# Patient Record
Sex: FEMALE | Race: WHITE | Employment: UNEMPLOYED | URBAN - METROPOLITAN AREA
[De-identification: names, ages, dates, MRNs, and addresses within clinical notes are randomized per-mention and may not be internally consistent; named-entity substitution may affect disease eponyms.]

---

## 2017-01-01 ENCOUNTER — HOSPITAL ENCOUNTER (EMERGENCY)
Age: 52
Discharge: LEFT BEFORE DISCHARGE | End: 2017-02-24
Attending: EMERGENCY MEDICINE
Payer: COMMERCIAL

## 2017-01-01 ENCOUNTER — APPOINTMENT (OUTPATIENT)
Dept: CT IMAGING | Age: 52
DRG: 871 | End: 2017-01-01
Attending: EMERGENCY MEDICINE
Payer: SELF-PAY

## 2017-01-01 ENCOUNTER — APPOINTMENT (OUTPATIENT)
Dept: GENERAL RADIOLOGY | Age: 52
DRG: 871 | End: 2017-01-01
Attending: EMERGENCY MEDICINE
Payer: SELF-PAY

## 2017-01-01 ENCOUNTER — TELEPHONE (OUTPATIENT)
Dept: HOSPICE | Facility: HOSPICE | Age: 52
End: 2017-01-01

## 2017-01-01 ENCOUNTER — HOSPITAL ENCOUNTER (INPATIENT)
Age: 52
LOS: 3 days | DRG: 871 | End: 2017-03-15
Attending: EMERGENCY MEDICINE | Admitting: FAMILY MEDICINE
Payer: SELF-PAY

## 2017-01-01 VITALS
HEIGHT: 65 IN | SYSTOLIC BLOOD PRESSURE: 144 MMHG | RESPIRATION RATE: 18 BRPM | OXYGEN SATURATION: 90 % | HEART RATE: 79 BPM | BODY MASS INDEX: 21.61 KG/M2 | TEMPERATURE: 97.7 F | WEIGHT: 129.7 LBS | DIASTOLIC BLOOD PRESSURE: 73 MMHG

## 2017-01-01 VITALS
SYSTOLIC BLOOD PRESSURE: 111 MMHG | DIASTOLIC BLOOD PRESSURE: 71 MMHG | BODY MASS INDEX: 23.32 KG/M2 | RESPIRATION RATE: 22 BRPM | HEIGHT: 65 IN | WEIGHT: 140 LBS | HEART RATE: 99 BPM | TEMPERATURE: 98.6 F | OXYGEN SATURATION: 96 %

## 2017-01-01 DIAGNOSIS — A41.9 SEPSIS, DUE TO UNSPECIFIED ORGANISM: ICD-10-CM

## 2017-01-01 DIAGNOSIS — K55.9 ISCHEMIC BOWEL DISEASE (HCC): ICD-10-CM

## 2017-01-01 DIAGNOSIS — K70.31 ALCOHOLIC CIRRHOSIS OF LIVER WITH ASCITES (HCC): ICD-10-CM

## 2017-01-01 DIAGNOSIS — K66.8 PNEUMOPERITONEUM: Primary | ICD-10-CM

## 2017-01-01 DIAGNOSIS — F41.9 ANXIETY: ICD-10-CM

## 2017-01-01 DIAGNOSIS — K72.10 CHRONIC LIVER FAILURE WITHOUT HEPATIC COMA (HCC): ICD-10-CM

## 2017-01-01 DIAGNOSIS — G40.909 SEIZURE DISORDER (HCC): ICD-10-CM

## 2017-01-01 DIAGNOSIS — R06.02 SHORTNESS OF BREATH AT REST: ICD-10-CM

## 2017-01-01 LAB
ALBUMIN SERPL BCP-MCNC: 3.4 G/DL (ref 3.4–5)
ALBUMIN/GLOB SERPL: 0.6 {RATIO} (ref 0.8–1.7)
ALP SERPL-CCNC: 104 U/L (ref 45–117)
ALT SERPL-CCNC: 52 U/L (ref 13–56)
AMMONIA PLAS-SCNC: 55 UMOL/L (ref 11–32)
AMORPH CRY URNS QL MICRO: ABNORMAL
ANION GAP BLD CALC-SCNC: 12 MMOL/L (ref 3–18)
ANION GAP BLD CALC-SCNC: 9 MMOL/L (ref 3–18)
APPEARANCE UR: ABNORMAL
APTT PPP: 46 SEC (ref 23–36.4)
AST SERPL W P-5'-P-CCNC: 183 U/L (ref 15–37)
ATRIAL RATE: 109 BPM
BACTERIA SPEC CULT: NORMAL
BACTERIA URNS QL MICRO: ABNORMAL /HPF
BASOPHILS # BLD AUTO: 0 K/UL (ref 0–0.1)
BASOPHILS # BLD: 1 % (ref 0–2)
BILIRUB SERPL-MCNC: 13.5 MG/DL (ref 0.2–1)
BILIRUB UR QL: ABNORMAL
BUN SERPL-MCNC: 7 MG/DL (ref 7–18)
BUN SERPL-MCNC: 7 MG/DL (ref 7–18)
BUN/CREAT SERPL: 10 (ref 12–20)
BUN/CREAT SERPL: 7 (ref 12–20)
CALCIUM SERPL-MCNC: 7.9 MG/DL (ref 8.5–10.1)
CALCIUM SERPL-MCNC: 8.5 MG/DL (ref 8.5–10.1)
CALCULATED P AXIS, ECG09: 52 DEGREES
CALCULATED R AXIS, ECG10: 25 DEGREES
CALCULATED T AXIS, ECG11: -70 DEGREES
CHLORIDE SERPL-SCNC: 100 MMOL/L (ref 100–108)
CHLORIDE SERPL-SCNC: 105 MMOL/L (ref 100–108)
CK MB CFR SERPL CALC: ABNORMAL % (ref 0–4)
CK MB SERPL-MCNC: <1 NG/ML (ref 5–25)
CK SERPL-CCNC: 36 U/L (ref 26–192)
CO2 SERPL-SCNC: 23 MMOL/L (ref 21–32)
CO2 SERPL-SCNC: 25 MMOL/L (ref 21–32)
COLOR UR: ABNORMAL
CREAT SERPL-MCNC: 0.73 MG/DL (ref 0.6–1.3)
CREAT SERPL-MCNC: 0.99 MG/DL (ref 0.6–1.3)
DIAGNOSIS, 93000: NORMAL
DIFFERENTIAL METHOD BLD: ABNORMAL
EOSINOPHIL # BLD: 0 K/UL (ref 0–0.4)
EOSINOPHIL NFR BLD: 0 % (ref 0–5)
EPITH CASTS URNS QL MICRO: ABNORMAL /LPF (ref 0–5)
ERYTHROCYTE [DISTWIDTH] IN BLOOD BY AUTOMATED COUNT: 20 % (ref 11.6–14.5)
GLOBULIN SER CALC-MCNC: 5.9 G/DL (ref 2–4)
GLUCOSE SERPL-MCNC: 108 MG/DL (ref 74–99)
GLUCOSE SERPL-MCNC: 190 MG/DL (ref 74–99)
GLUCOSE UR STRIP.AUTO-MCNC: NEGATIVE MG/DL
HCT VFR BLD AUTO: 25.2 % (ref 35–45)
HGB BLD-MCNC: 8.4 G/DL (ref 12–16)
HGB UR QL STRIP: NEGATIVE
INR PPP: 2.7 (ref 0.8–1.2)
KETONES UR QL STRIP.AUTO: NEGATIVE MG/DL
LACTATE BLD-SCNC: 2.3 MMOL/L (ref 0.4–2)
LACTATE BLD-SCNC: 3.9 MMOL/L (ref 0.4–2)
LEUKOCYTE ESTERASE UR QL STRIP.AUTO: ABNORMAL
LIPASE SERPL-CCNC: 97 U/L (ref 73–393)
LYMPHOCYTES # BLD AUTO: 18 % (ref 21–52)
LYMPHOCYTES # BLD: 0.6 K/UL (ref 0.9–3.6)
MCH RBC QN AUTO: 37.8 PG (ref 24–34)
MCHC RBC AUTO-ENTMCNC: 33.3 G/DL (ref 31–37)
MCV RBC AUTO: 113.5 FL (ref 74–97)
MONOCYTES # BLD: 0.7 K/UL (ref 0.05–1.2)
MONOCYTES NFR BLD AUTO: 19 % (ref 3–10)
NEUTS SEG # BLD: 2.3 K/UL (ref 1.8–8)
NEUTS SEG NFR BLD AUTO: 62 % (ref 40–73)
NITRITE UR QL STRIP.AUTO: POSITIVE
P-R INTERVAL, ECG05: 188 MS
PH UR STRIP: 5.5 [PH] (ref 5–8)
PLATELET # BLD AUTO: 132 K/UL (ref 135–420)
PMV BLD AUTO: 9.8 FL (ref 9.2–11.8)
POTASSIUM SERPL-SCNC: 2.9 MMOL/L (ref 3.5–5.5)
POTASSIUM SERPL-SCNC: 3.2 MMOL/L (ref 3.5–5.5)
PROT SERPL-MCNC: 9.3 G/DL (ref 6.4–8.2)
PROT UR STRIP-MCNC: ABNORMAL MG/DL
PROTHROMBIN TIME: 27.3 SEC (ref 11.5–15.2)
Q-T INTERVAL, ECG07: 392 MS
QRS DURATION, ECG06: 82 MS
QTC CALCULATION (BEZET), ECG08: 527 MS
RBC # BLD AUTO: 2.22 M/UL (ref 4.2–5.3)
RBC #/AREA URNS HPF: ABNORMAL /HPF (ref 0–5)
SERVICE CMNT-IMP: NORMAL
SODIUM SERPL-SCNC: 135 MMOL/L (ref 136–145)
SODIUM SERPL-SCNC: 139 MMOL/L (ref 136–145)
SP GR UR REFRACTOMETRY: 1.02 (ref 1–1.03)
TROPONIN I SERPL-MCNC: 0.05 NG/ML (ref 0–0.04)
UROBILINOGEN UR QL STRIP.AUTO: 2 EU/DL (ref 0.2–1)
VENTRICULAR RATE, ECG03: 109 BPM
WBC # BLD AUTO: 3.6 K/UL (ref 4.6–13.2)
WBC URNS QL MICRO: ABNORMAL /HPF (ref 0–4)

## 2017-01-01 PROCEDURE — 82550 ASSAY OF CK (CPK): CPT | Performed by: EMERGENCY MEDICINE

## 2017-01-01 PROCEDURE — 81001 URINALYSIS AUTO W/SCOPE: CPT | Performed by: EMERGENCY MEDICINE

## 2017-01-01 PROCEDURE — 96375 TX/PRO/DX INJ NEW DRUG ADDON: CPT

## 2017-01-01 PROCEDURE — 74011000258 HC RX REV CODE- 258: Performed by: EMERGENCY MEDICINE

## 2017-01-01 PROCEDURE — 83605 ASSAY OF LACTIC ACID: CPT

## 2017-01-01 PROCEDURE — 93005 ELECTROCARDIOGRAM TRACING: CPT

## 2017-01-01 PROCEDURE — 77030035281 HC STBL NDL INTOSSE TELE -B

## 2017-01-01 PROCEDURE — 65270000029 HC RM PRIVATE

## 2017-01-01 PROCEDURE — 85025 COMPLETE CBC W/AUTO DIFF WBC: CPT | Performed by: EMERGENCY MEDICINE

## 2017-01-01 PROCEDURE — 74011250636 HC RX REV CODE- 250/636: Performed by: EMERGENCY MEDICINE

## 2017-01-01 PROCEDURE — 74011250636 HC RX REV CODE- 250/636: Performed by: NURSE PRACTITIONER

## 2017-01-01 PROCEDURE — 77030028990 HC ADH TISS DERMFLX CHMP -B

## 2017-01-01 PROCEDURE — 74011250637 HC RX REV CODE- 250/637: Performed by: NURSE PRACTITIONER

## 2017-01-01 PROCEDURE — 51798 US URINE CAPACITY MEASURE: CPT

## 2017-01-01 PROCEDURE — 74011250637 HC RX REV CODE- 250/637: Performed by: FAMILY MEDICINE

## 2017-01-01 PROCEDURE — 82140 ASSAY OF AMMONIA: CPT | Performed by: EMERGENCY MEDICINE

## 2017-01-01 PROCEDURE — 99285 EMERGENCY DEPT VISIT HI MDM: CPT

## 2017-01-01 PROCEDURE — 74011250636 HC RX REV CODE- 250/636: Performed by: HOSPITALIST

## 2017-01-01 PROCEDURE — 96368 THER/DIAG CONCURRENT INF: CPT

## 2017-01-01 PROCEDURE — 74176 CT ABD & PELVIS W/O CONTRAST: CPT

## 2017-01-01 PROCEDURE — 75810000275 HC EMERGENCY DEPT VISIT NO LEVEL OF CARE

## 2017-01-01 PROCEDURE — 96365 THER/PROPH/DIAG IV INF INIT: CPT

## 2017-01-01 PROCEDURE — 87040 BLOOD CULTURE FOR BACTERIA: CPT | Performed by: EMERGENCY MEDICINE

## 2017-01-01 PROCEDURE — 87086 URINE CULTURE/COLONY COUNT: CPT | Performed by: EMERGENCY MEDICINE

## 2017-01-01 PROCEDURE — 83690 ASSAY OF LIPASE: CPT | Performed by: EMERGENCY MEDICINE

## 2017-01-01 PROCEDURE — 80048 BASIC METABOLIC PNL TOTAL CA: CPT | Performed by: EMERGENCY MEDICINE

## 2017-01-01 PROCEDURE — 36415 COLL VENOUS BLD VENIPUNCTURE: CPT | Performed by: EMERGENCY MEDICINE

## 2017-01-01 PROCEDURE — 85610 PROTHROMBIN TIME: CPT | Performed by: EMERGENCY MEDICINE

## 2017-01-01 PROCEDURE — 77030028995

## 2017-01-01 PROCEDURE — 96366 THER/PROPH/DIAG IV INF ADDON: CPT

## 2017-01-01 PROCEDURE — 80053 COMPREHEN METABOLIC PANEL: CPT | Performed by: EMERGENCY MEDICINE

## 2017-01-01 PROCEDURE — 76450000000

## 2017-01-01 PROCEDURE — 74011000250 HC RX REV CODE- 250: Performed by: NURSE PRACTITIONER

## 2017-01-01 PROCEDURE — 71010 XR CHEST PORT: CPT

## 2017-01-01 PROCEDURE — 85730 THROMBOPLASTIN TIME PARTIAL: CPT | Performed by: EMERGENCY MEDICINE

## 2017-01-01 PROCEDURE — 77030034849

## 2017-01-01 RX ORDER — ATROPINE SULFATE 10 MG/ML
3 SOLUTION/ DROPS OPHTHALMIC ONCE
Status: COMPLETED | OUTPATIENT
Start: 2017-01-01 | End: 2017-01-01

## 2017-01-01 RX ORDER — SODIUM CHLORIDE 0.9 % (FLUSH) 0.9 %
5-10 SYRINGE (ML) INJECTION AS NEEDED
Status: DISCONTINUED | OUTPATIENT
Start: 2017-01-01 | End: 2017-01-01

## 2017-01-01 RX ORDER — LORAZEPAM 2 MG/ML
0.25 INJECTION INTRAMUSCULAR
Status: DISCONTINUED | OUTPATIENT
Start: 2017-01-01 | End: 2017-01-01

## 2017-01-01 RX ORDER — MORPHINE SULFATE 100 MG/5ML
10 SOLUTION ORAL EVERY 4 HOURS
Status: DISCONTINUED | OUTPATIENT
Start: 2017-01-01 | End: 2017-01-01 | Stop reason: HOSPADM

## 2017-01-01 RX ORDER — GLYCOPYRROLATE 0.2 MG/ML
0.2 INJECTION INTRAMUSCULAR; INTRAVENOUS
Status: DISCONTINUED | OUTPATIENT
Start: 2017-01-01 | End: 2017-01-01 | Stop reason: HOSPADM

## 2017-01-01 RX ORDER — LORAZEPAM 2 MG/ML
1 CONCENTRATE ORAL EVERY 4 HOURS
Status: DISCONTINUED | OUTPATIENT
Start: 2017-01-01 | End: 2017-01-01

## 2017-01-01 RX ORDER — MORPHINE SULFATE 2 MG/ML
2 INJECTION, SOLUTION INTRAMUSCULAR; INTRAVENOUS
Status: DISCONTINUED | OUTPATIENT
Start: 2017-01-01 | End: 2017-01-01 | Stop reason: HOSPADM

## 2017-01-01 RX ORDER — ACETAMINOPHEN 650 MG/1
650 SUPPOSITORY RECTAL EVERY 6 HOURS
Status: DISCONTINUED | OUTPATIENT
Start: 2017-01-01 | End: 2017-01-01 | Stop reason: HOSPADM

## 2017-01-01 RX ORDER — MORPHINE SULFATE 2 MG/ML
2 INJECTION, SOLUTION INTRAMUSCULAR; INTRAVENOUS ONCE
Status: COMPLETED | OUTPATIENT
Start: 2017-01-01 | End: 2017-01-01

## 2017-01-01 RX ORDER — ATROPINE SULFATE 10 MG/ML
2 SOLUTION/ DROPS OPHTHALMIC
Status: DISCONTINUED | OUTPATIENT
Start: 2017-01-01 | End: 2017-01-01 | Stop reason: HOSPADM

## 2017-01-01 RX ORDER — LORAZEPAM 2 MG/ML
1 INJECTION INTRAMUSCULAR
Status: DISCONTINUED | OUTPATIENT
Start: 2017-01-01 | End: 2017-01-01 | Stop reason: HOSPADM

## 2017-01-01 RX ORDER — LORAZEPAM 2 MG/ML
1 CONCENTRATE ORAL EVERY 4 HOURS
Status: DISCONTINUED | OUTPATIENT
Start: 2017-01-01 | End: 2017-01-01 | Stop reason: HOSPADM

## 2017-01-01 RX ORDER — MORPHINE SULFATE 100 MG/5ML
10 SOLUTION ORAL ONCE
Status: ACTIVE | OUTPATIENT
Start: 2017-01-01 | End: 2017-01-01

## 2017-01-01 RX ORDER — SCOLOPAMINE TRANSDERMAL SYSTEM 1 MG/1
1.5 PATCH, EXTENDED RELEASE TRANSDERMAL
Status: DISCONTINUED | OUTPATIENT
Start: 2017-01-01 | End: 2017-01-01 | Stop reason: HOSPADM

## 2017-01-01 RX ORDER — LEVOFLOXACIN 5 MG/ML
750 INJECTION, SOLUTION INTRAVENOUS EVERY 24 HOURS
Status: DISCONTINUED | OUTPATIENT
Start: 2017-01-01 | End: 2017-01-01

## 2017-01-01 RX ORDER — MORPHINE SULFATE 100 MG/5ML
5 SOLUTION ORAL EVERY 4 HOURS
Status: DISCONTINUED | OUTPATIENT
Start: 2017-01-01 | End: 2017-01-01

## 2017-01-01 RX ADMIN — ACETAMINOPHEN 650 MG: 650 SUPPOSITORY RECTAL at 18:00

## 2017-01-01 RX ADMIN — MORPHINE SULFATE 10 MG: 100 SOLUTION ORAL at 12:12

## 2017-01-01 RX ADMIN — Medication 2 MG: at 14:22

## 2017-01-01 RX ADMIN — SODIUM CHLORIDE 1764 ML: 900 INJECTION, SOLUTION INTRAVENOUS at 12:26

## 2017-01-01 RX ADMIN — MORPHINE SULFATE 10 MG: 100 SOLUTION ORAL at 17:07

## 2017-01-01 RX ADMIN — LORAZEPAM 1 MG: 2 SOLUTION, CONCENTRATE ORAL at 17:18

## 2017-01-01 RX ADMIN — LORAZEPAM 1 MG: 2 SOLUTION, CONCENTRATE ORAL at 01:12

## 2017-01-01 RX ADMIN — ATROPINE SULFATE 2 DROP: 10 SOLUTION/ DROPS OPHTHALMIC at 18:54

## 2017-01-01 RX ADMIN — LORAZEPAM 1 MG: 2 INJECTION, SOLUTION INTRAMUSCULAR; INTRAVENOUS at 18:51

## 2017-01-01 RX ADMIN — Medication 2 MG: at 03:53

## 2017-01-01 RX ADMIN — LORAZEPAM 0.26 MG: 2 INJECTION, SOLUTION INTRAMUSCULAR; INTRAVENOUS at 03:49

## 2017-01-01 RX ADMIN — MORPHINE SULFATE 5 MG: 100 SOLUTION ORAL at 00:45

## 2017-01-01 RX ADMIN — MORPHINE SULFATE 5 MG: 100 SOLUTION ORAL at 20:20

## 2017-01-01 RX ADMIN — MORPHINE SULFATE 10 MG: 100 SOLUTION ORAL at 01:12

## 2017-01-01 RX ADMIN — MEROPENEM 1 G: 1 INJECTION, POWDER, FOR SOLUTION INTRAVENOUS at 13:44

## 2017-01-01 RX ADMIN — MORPHINE SULFATE 5 MG: 100 SOLUTION ORAL at 12:56

## 2017-01-01 RX ADMIN — LORAZEPAM 1 MG: 2 SOLUTION, CONCENTRATE ORAL at 13:00

## 2017-01-01 RX ADMIN — LORAZEPAM 1 MG: 2 SOLUTION, CONCENTRATE ORAL at 09:34

## 2017-01-01 RX ADMIN — MORPHINE SULFATE 5 MG: 100 SOLUTION ORAL at 04:31

## 2017-01-01 RX ADMIN — GLYCOPYRROLATE 0.2 MG: 0.2 INJECTION INTRAMUSCULAR; INTRAVENOUS at 17:05

## 2017-01-01 RX ADMIN — ACETAMINOPHEN 650 MG: 650 SUPPOSITORY RECTAL at 12:00

## 2017-01-01 RX ADMIN — LORAZEPAM 1 MG: 2 SOLUTION, CONCENTRATE ORAL at 22:00

## 2017-01-01 RX ADMIN — LEVOFLOXACIN 750 MG: 5 INJECTION, SOLUTION INTRAVENOUS at 12:27

## 2017-01-01 RX ADMIN — GLYCOPYRROLATE 0.2 MG: 0.2 INJECTION INTRAMUSCULAR; INTRAVENOUS at 01:15

## 2017-01-01 RX ADMIN — ATROPINE SULFATE 3 DROP: 10 SOLUTION/ DROPS OPHTHALMIC at 08:00

## 2017-01-01 RX ADMIN — LORAZEPAM 1 MG: 2 SOLUTION, CONCENTRATE ORAL at 04:51

## 2017-01-01 RX ADMIN — LORAZEPAM 1 MG: 2 SOLUTION, CONCENTRATE ORAL at 01:34

## 2017-01-01 RX ADMIN — Medication 2 MG: at 20:59

## 2017-01-01 RX ADMIN — LORAZEPAM 0.26 MG: 2 INJECTION, SOLUTION INTRAMUSCULAR; INTRAVENOUS at 07:24

## 2017-01-01 RX ADMIN — SODIUM CHLORIDE 1000 MG: 900 INJECTION, SOLUTION INTRAVENOUS at 12:27

## 2017-01-01 RX ADMIN — MORPHINE SULFATE 2 MG: 2 INJECTION, SOLUTION INTRAMUSCULAR; INTRAVENOUS at 12:00

## 2017-01-01 RX ADMIN — MORPHINE SULFATE 5 MG: 100 SOLUTION ORAL at 17:17

## 2017-01-01 RX ADMIN — MORPHINE SULFATE 10 MG: 100 SOLUTION ORAL at 09:36

## 2017-01-01 RX ADMIN — LORAZEPAM 1 MG: 2 SOLUTION, CONCENTRATE ORAL at 21:52

## 2017-01-01 RX ADMIN — MORPHINE SULFATE 10 MG: 100 SOLUTION ORAL at 21:52

## 2017-01-01 RX ADMIN — Medication 2 MG: at 07:27

## 2017-03-12 PROBLEM — K55.9 ISCHEMIC BOWEL DISEASE (HCC): Status: ACTIVE | Noted: 2017-01-01

## 2017-03-12 PROBLEM — K74.60 CIRRHOSIS (HCC): Status: ACTIVE | Noted: 2017-01-01

## 2017-03-12 PROBLEM — K66.8 PNEUMOPERITONEUM: Status: ACTIVE | Noted: 2017-01-01

## 2017-03-12 PROBLEM — K72.90 LIVER FAILURE (HCC): Status: ACTIVE | Noted: 2017-01-01

## 2017-03-12 PROBLEM — A41.9 SEPSIS (HCC): Status: ACTIVE | Noted: 2017-01-01

## 2017-03-12 NOTE — H&P
History and Physical    Patient: Wyatt Pritchard MRN: [id number]  SSN: [id number]    YOB: 1965  Age: 47 y.o. Sex: female      Subjective:      Wyatt Pritchard is a 47 y.o. female who was sent to the ER from the nursing home due to abdominal pain. Patient found to be poorly responsive with evidence of bowel perforation and concern for sepsis. Patient poorly responsive. Patient has been in the nursing home for chronic care due to endstage liver disease with cirrhosis. Past Medical History:   Diagnosis Date    Liver disease      No past surgical history on file. No family history on file. Social History   Substance Use Topics    Smoking status: Not on file    Smokeless tobacco: Not on file    Alcohol use Not on file      Prior to Admission medications    Not on File        Allergies   Allergen Reactions    Iodine Unknown (comments)    Pcn [Penicillins] Unknown (comments)    Sulfa (Sulfonamide Antibiotics) Unknown (comments)       Review of Systems:  Review of systems not obtained due to patient factors. Objective:     Vitals:    03/12/17 1230 03/12/17 1245 03/12/17 1300 03/12/17 1315   BP: 111/67 108/48  110/83   Pulse: (!) 109 (!) 109 (!) 109    Resp: (!) 33 22 29    Temp:       SpO2: 99% 99% 100% 98%   Weight:       Height:            Physical Exam:  GENERAL: severe distress, slowed mentation, appears older than stated age  LUNG: diminished breath sounds R base, L base  HEART: regular rate and rhythm, S1, S2 normal, no murmur, click, rub or gallop  ABDOMEN: abnormal findings:  tenderness moderate in the periumbilical area, hypoactive bowel sounds  SKIN: jaundice    Assessment:     Hospital Problems  Date Reviewed: 3/12/2017          Codes Class Noted POA    Pneumoperitoneum ICD-10-CM: K66.8  ICD-9-CM: 568.89  3/12/2017 Yes        Cirrhosis ([facility] [address] ICD-10-CM: K74.60  ICD-9-CM: 571.5  3/12/2017 Unknown              Plan:     Patient poor surgical candidate. Surgery consulted.  May become comfort care only.     Signed By: Meme Barrera MD     March 12, 2017

## 2017-03-12 NOTE — PROGRESS NOTES
1847 Received to room 453 from the JACQUELINE Mace. a resident at [facility]. Alert/confused. Unaware of name, place, time, situation. Skin jaundice. Fall precautions. Side rails X3. Bed alarm on. Family at bedside. Call bell phone within reach.

## 2017-03-12 NOTE — CONSULTS
[address]    Name:  Bernabe Mathias  MR#:  [id number]  :  1965  Account #:  [de-identified]  Date of Adm:  2017  Date of Consultation:  2017      REASON FOR CONSULTATION: Possible ischemic bowel. HISTORY OF PRESENT ILLNESS: The patient is a 81-year-old  extremely ill woman who apparently has been being spending a long  hospitalization at [facility] very recently, who was apparently  sent to their nursing home, who presents to our emergency room now  with severe multiorgan failure, liver failure, jaundice, and ascites. She  apparently complained of abdominal pain and was brought here for  that reason, however, from a mental status perspective, she is  incoherent. It is unclear why she was at [facility]. Apparently,  the patient is a DNR with a long history of liver failure. I have been  asked to comment on her candidacy for surgery should she need that. A CT scan in the emergency room was done to assess her abdomen  and this showed pneumatosis in the right colon with possible small  dots of free air around the pericolonic tissue. PAST MEDICAL HISTORY: Unavailable. PAST SURGICAL HISTORY: Unavailable. REVIEW OF SYSTEMS  Unavailable. Apparently, this was unable to be obtained by the ER  physicians as well. PHYSICAL EXAMINATION  GENERAL: She is incoherent, rolling around on the stretcher, quite  jaundiced. Unable to respond to questions. HEAD: Normocephalic, atraumatic. EYES: Conjunctivae are jaundiced. CHEST: Clear. ABDOMEN: Protuberant, soft, with some ascites. NEUROLOGIC: As above. LABORATORY DATA: Show a white blood cell count of 3.6, an  hemoglobin and hematocrit of 8.4 and 25, platelet count of 783. She  has a sodium of 135, potassium 3.2, glucose 108 and a total bilirubin  of 13.5 with mildly elevated transaminases and her AST is 183, her  alkaline phosphatase is 104. Her ammonia is 55.     IMPRESSION: Possible dead gut in a patient who is not an operative  candidate due to multiorgan failure, particularly hepatic failure. As she  is a DO NOT RESUSCITATE, would recommend comfort care only.         MD Patrick Ordonez / DINO  D:  03/12/2017   14:37  T:  03/12/2017   15:16  Job #:  [id number]

## 2017-03-12 NOTE — PROGRESS NOTES
Surgery  Asked to see pt with end stage liver dz , jaundice and ascites who presents with signs of ischemic gut. Pneumatosis with a question of pericolonic bubbles of air. Pt is poorly conscious and has had this condition for some time. She is currently in a RN Home. By the [facility] class she is a C. She is not an operative candidate.  D/W family

## 2017-03-12 NOTE — ED TRIAGE NOTES
Per EMS< pt from [address] [place] [address] home, pt c/o abdominal pain. Pt skin appears to be jaundiced. pt has altered mental status, pt baseline aaox4.

## 2017-03-12 NOTE — ROUTINE PROCESS
TRANSFER - OUT REPORT:    Verbal report given to Aliyah Altamirano RN(name) on [facility]  being transferred to [facility] (unit) for routine progression of care       Report consisted of patients Situation, Background, Assessment and   Recommendations(SBAR). Information from the following report(s) SBAR, Kardex, [facility] and Recent Results was reviewed with the receiving nurse. Lines:       Opportunity for questions and clarification was provided.       Patient transported with:   Registered Nurse  Tech

## 2017-03-12 NOTE — ED PROVIDER NOTES
HPI Comments: 10:31 AM Haris Acosta is a 47 y.o. female who presents to the ED via EMS from [facility] for abd pain. EMS state the pt has potential AMS and is severely jaundice. Pt was recently d/c from the hospital. History is limited due to pt current status. Family notes pt recently discharged from [facility]. Pt is a DNR. No other acute sxs noted. The history is provided by the patient. No past medical history on file. No past surgical history on file. No family history on file. Social History     Social History    Marital status: N/A     Spouse name: N/A    Number of children: N/A    Years of education: N/A     Occupational History    Not on file. Social History Main Topics    Smoking status: Not on file    Smokeless tobacco: Not on file    Alcohol use Not on file    Drug use: Not on file    Sexual activity: Not on file     Other Topics Concern    Not on file     Social History Narrative         ALLERGIES: Review of patient's allergies indicates not on file. Review of Systems   Unable to perform ROS: Acuity of condition       There were no vitals filed for this visit. Physical Exam   Constitutional:   sequalae of cirrhosis     HENT:   Head: Normocephalic and atraumatic. Dried blood around lips    Eyes: Conjunctivae are normal. Pupils are equal, round, and reactive to light. Cardiovascular: Normal rate and regular rhythm. Pulmonary/Chest: Effort normal and breath sounds normal.   Abdominal: Soft. Ascites mild  ttp but not rigid; Neurological:   Confused x 1 week per daughter   Skin: Skin is warm and dry. MDM  Number of Diagnoses or Management Options  Diagnosis management comments: 1. ALOC: with fever, cirrhosis; start sepsis protocol. Guarded prognoisis; pt is DNR. Limited to a CT abdomen with IV contrast from the patient has an iodine allergy listed.   Family does not know what she is actually allergic to she has never had a CT with IV study done so will obtain a dry skin. She recently changed name and reports, family reports that they are having trouble merging the charts. They gave him year-old name and attached to the MR number  Ramirez Waldrpo, 47 y.o., 1965  MRN:   [id number]  CSN:   [id number]    cxr napd  Ct reviewed by me; pending read. 12:55 PM + free air. Likely ischemic colitils pending d/w Colorectal.   EKG ST at 109 nl axis. Nl int. No javed/d. No hypertrophy. D/w dr dahl ICU will eval pt. D/w dr. Dayana navarrete is with pt.   moon almeidar Riblet surgery in on [address] . 1:32 PM d/w [address] [phone] , who is a hospice nurse,   At most the patient has probably 3 months left to live she is a poor surgical candidate,,  And will likely make her comfort care/hospice. Pending discussion with general surgeon. 2:56 PM d/w hospice       ED Course       Procedures  Scribe Attestation:     [place], scribing for and in the presence of Hemanth Rodrigez MD March 12, 2017     Signed by: Zenia Astudillo, March 12, 2017 at 10:31 AM     Physician Attestation:   I personally performed the services described in this documentation, reviewed and edited the documentation which was dictated to the scribe in my presence, and it accurately records my words and actions.  Hemanth Rodrigez MD  March 12, 2017

## 2017-03-13 NOTE — PROGRESS NOTES
NUTRITION    BPA/MST Referral       RECOMMENDATIONS / PLAN:     - Provide nutrition interventions consistent with plan of care goals for patient; on comfort measures only  - Continue RD inpatient monitoring and evaluation. NUTRITION INTERVENTIONS & DIAGNOSIS:     [] Meals/Snacks: NPO    Nutrition Diagnosis:  Inadequate energy intake related to insufficient calorie diet and inability to eat due to decreased responsiveness as evidenced by NPO    ASSESSMENT:     Subjective/Objective:  Patient with decreased responsiveness. Per nutrition screen, had unplanned weight loss, decreased appetite, and poor po intake PTA. Currently NPO. Palliative following; pt transitioned to comfort measures today per family wishes.  Noted consult to Hospice    Average po intake adequate to meet patients estimated nutritional needs:   [] Yes     [x] No   [] Unable to determine at this time    Diet:   NPO     Food Allergies:  None known   Current Appetite:   [] Good     [] Fair     [] Poor     [x] Other:  Unknown; NPO  Appetite/meal intake prior to admission:   [] Good     [] Fair     [] Poor     [] Other:  Feeding Limitations:  [] Swallowing difficulty    [] Chewing difficulty    [x] Other:  Unknown   Current Meal Intake: Patient Vitals for the past 100 hrs:   % Diet Eaten   03/13/17 0951 0 %       BM:  Unknown    Skin Integrity:  No pressure ulcer or wound  Edema:  None   Pertinent Medications: Reviewed    Recent Labs      03/13/17   0329  03/12/17   1106   NA  139  135*   K  2.9*  3.2*   CL  105  100   CO2  25  23   GLU  190*  108*   BUN  7  7   CREA  0.73  0.99   CA  7.9*  8.5   ALB   --   3.4   SGOT   --   183*   ALT   --   52       Intake/Output Summary (Last 24 hours) at 03/13/17 1223  Last data filed at 03/13/17 0951   Gross per 24 hour   Intake                0 ml   Output                0 ml   Net                0 ml       Anthropometrics:  Ht Readings from Last 1 Encounters:   03/12/17 5' 5\" (1.651 m)     Last 3 Recorded Weights in this Encounter    03/12/17 1021   Weight: 58.8 kg (129 lb 11.2 oz)     Body mass index is 21.58 kg/(m^2). Weight History:    Per nutrition screen, pt reported experiencing unplanned weight loss PTA. Weight Metrics 3/12/2017   Weight 129 lb 11.2 oz   BMI 21.58 kg/m2        Admitting Diagnosis: Ischemic bowel disease ([facility] [address]  Liver failure ([facility] [address]  Sepsis ([facility] [address]  Past Medical History:   Diagnosis Date    Liver disease        Education Needs:        [x] None identified  [] Identified - Not appropriate at this time  []  Identified and addressed - refer to education log  Learning Limitations:   [x] None identified  [] Identified    Cultural, Sabianist & ethnic food preferences:  [x] None identified    [] Identified and addressed     ESTIMATED NUTRITION NEEDS:     Calories: 7749-2928 kcal (MSJx1.2-1.3) based on  [x] Actual BW:   59 kg   [] IBW:   Protein: 47-59 gm (0.8-1 gm/kg) based on  [x] Actual BW:   59 kg   [] IBW:   Fluid: 1 mL/kcal     MONITORING & EVALUATION:     Nutrition Goal(s):   1. Provide nutrition intervention as appropriate with goals of care for the next 7 days.  Outcome:  [] Met/Ongoing    []  Not Met    [x] New/Initial Goal     Monitoring:   [] Diet tolerance   [] Meal intake   [] Supplement intake   [] GI symptoms/ability to tolerate po diet   [] Respiratory status   [x] Plan of care      Previous Recommendations (for follow-up assessments only):     []   Implemented       []   Not Implemented (RD to address)     [] No Recommendation Made     Discharge Planning:  Comfort feeds as tolerates   [x] Participated in care planning, discharge planning, & interdisciplinary rounds as appropriate      Sherry Abdalla, [address] [place] [address]   Pager: [phone]

## 2017-03-13 NOTE — ROUTINE PROCESS
Bedside and Verbal shift change report given to JOAN Padron (oncoming nurse) by [employer], RN (offgoing nurse). Report included the following information SBAR and Kardex.

## 2017-03-13 NOTE — CONSULTS
Patient seen with daughter at bedside. Daughter Stephanie Chapa is MPOA on file per document on chart (was completed under name Miguelangel Cam, requested for chart merge with admissions). Wish per daughter is to focus on comfort only, no further labs, antibiotics. She does desire for aggressive management of comfort and scheduled antiety / pain medication as patient had significant pain at baseline. Also scheduled ativan with benefit of seizure prevention. Patient last had alcohol approximately 1.5 weeks ago per daughter. Full note to follow.

## 2017-03-13 NOTE — CONSULTS
[facility]: [phone])  [facility]: [phone]   [facility]: [phone]    Patient Name: Lashell Madrid  YOB: 1965    Date of Initial Consult: 03/13/2017  Reason for Consult: End Stage Disease  Requesting Provider: Marli Will MD  Primary Care Physician: None      SUMMARY:   Lashell Madrid is a 47y.o. year old with a past history of end stage liver disease with cirrhosis, seizure disorder who was admitted on 3/12/2017 from [facility] with a diagnosis of abdominal pain. Current medical issues leading to Palliative Medicine involvement include: end stage disease. PALLIATIVE DIAGNOSES:   1. Liver failure  2. Sepsis  3. Anxiety  4. Seizure disorder     PLAN:   1. Visit with patient whom is non-responsive to this provider. Daughter Xavier Stearns (MPOA) documentation for MPOA printed out and placed on blue chart - is at bedside. Expresses wish to transition to comfort measures only at this time and stop any aggressive interventions including stopping labs, antibiotics or other treatments without benefit to comfort. 2. Daughter desires those interventions to improve comfort including proactive management of anxiety / pain, will place patient on schedule ativan and morphine (sublingual) for these purposes and for ativan to adjunct for seizure control. Add medications for secretion management as well. Daughter shares patient has not voided - bladder ultrasound reveals urine retention. Gar catheter placed in discussion with daughter for retention / comfort goal.   3. Comfort / inpatient hospice referral written by attending. Since have received call from [facility] stating wish for patient to go home (to her home) with hospice \"as soon as possible\". Home hospice referral placed here.   Spoke to Lamar Chavez case management to inform of referral.    4. Copies of DDNR, POST form on chart for completeness  5. Initial consult note routed to primary continuity provider  6. Communicated plan of care with: Palliative IDT, attending, daughter / Marco Paul,      GOALS OF CARE:     [====Goals of Care====]  GOALS OF CARE:  Patient / health care proxy stated goals: comfort    TREATMENT PREFERENCES:   Code Status: DNR    Advance Care Planning:  Advance Care Planning 3/12/2017   Patient's Healthcare Decision Maker is: Legal Next of Henri Kirkland   Primary Decision Maker Name Jing Venegas   Primary Decision Maker Phone Number [phone]   Primary Decision Maker Relationship to Patient Adult child   Secondary Decision Maker Name uLiz Mcdaniel [employer] Phone Number [phone]   Secondary Decision Maker Relationship to Patient Adult child   Confirm Advance Directive Yes, on file   Does the patient have other document types Other (comment);MOST/MOLST/POST/POLST     Other:    The palliative care team has discussed with patient / health care proxy about goals of care / treatment preferences for patient.  [====Goals of Care====]    Advance Care Planning 3/12/2017   Patient's Healthcare Decision Maker is: Legal Next of Henri Kirkland   Primary Decision Maker Name Jing Venegas   Primary Decision Maker Phone Number [phone]   Primary Decision Maker Relationship to Patient Adult child   Secondary Decision Maker Name Luiz cube19 Phone Number [phone]   Secondary Decision Maker Relationship to Patient Adult child   Confirm Advance Directive Yes, on file   Does the patient have other document types Other (comment);MOST/MOLST/POST/POLST       HISTORY:     History obtained from: daughter    CHIEF COMPLAINT: restlessness    HPI/SUBJECTIVE:    The patient is:   [] Verbal and participatory  [x] Non-participatory due to:   Daughter shares patient has been consuming alcohol for a long time and she states sadness that she is in this condition at this time as result.   Patient was most recently at [facility] and discharged to nursing facility last Friday and since became progressively worse / weaker at the nursing facility resulting in admission to [facility].       Clinical Pain Assessment (nonverbal scale for nonverbal patients): Pain: 5  Adult Non-Verbal Pain Assessment    Face  [] 0   No particular expression or smile  [x] 1   Occasional grimace, tearing, frowning, wrinkled forehead  [] 2   Frequent grimace, tearing, frowning, wrinkled forehead    Activity (movement)  [] 0   Lying quietly, normal position  [] 1   Seeking attention through movement or slow, cautious movement  [x] 2   Restless, excessive activity and/or withdrawal reflexes    Guarding  [] 0   Lying quietly, no positioning of hands over areas of body  [x] 1   Splinting areas of the body, tense  [] 2   Rigid, stiff    Physiology (vital signs)  [x] 0   Stable vital signs  [] 1   Change in any of the following: SBP > 20mm Hg; HR > 20/minute  [] 2   Change in any of the following: SBP > 30mm Hg; HR > 25/minute    Respiratory  [] 0   Baseline RR/SpO2, compliant with ventilator  [x] 1   RR > 10 above baseline, or 5% drop SpO2, mild asynchrony with ventilator  [] 2   RR > 20 above baseline, or 10% drop SpO2, asynchrony with ventilator    Total Non-Verbal Pain Score: 5     FUNCTIONAL ASSESSMENT:     Palliative Performance Scale (PPS):  PPS: 20       PSYCHOSOCIAL/SPIRITUAL SCREENING:     Advance Care Planning:  Advance Care Planning 3/12/2017   Patient's Healthcare Decision Maker is: Legal Next of Kin   Primary Decision Maker Name Clif Sheldon   Primary Decision Maker Phone Number [phone]   Primary Decision Maker Relationship to Patient Adult child   Secondary Decision Maker Name Sowmya Redd   Secondary Decision Maker Phone Number [phone]   Secondary Decision Maker Relationship to Patient Adult child   Confirm Advance Directive Yes, on file   Does the patient have other document types Other (comment);MOST/MOLST/POST/POLST     Any spiritual / Islam concerns:  [] Yes /  [x] No    Caregiver Burnout:  [] Yes /  [x] No /  [] No Caregiver Present      Anticipatory grief assessment:   [x] Normal  / [] Maladaptive       ESAS Anxiety: Anxiety: 6    ESAS Depression:          REVIEW OF SYSTEMS:     Positive and pertinent negative findings in ROS are noted above in HPI. The following systems were [] reviewed / [x] unable to be reviewed as noted in HPI  Other findings are noted below. Systems: constitutional, ears/nose/mouth/throat, respiratory, gastrointestinal, genitourinary, musculoskeletal, integumentary, neurologic, psychiatric, endocrine. Positive findings noted below. Modified ESAS Completed by: provider   Fatigue: 0 Drowsiness: 0     Pain: 5   Anxiety: 6       Dyspnea: 0   Best Well-Bein Constipation: No   Other Problem (Comment): 0          PHYSICAL EXAM:     Wt Readings from Last 3 Encounters:   17 58.8 kg (129 lb 11.2 oz)     Blood pressure 144/73, pulse 79, temperature 97.7 °F (36.5 °C), resp. rate 18, height 5' 5\" (1.651 m), weight 58.8 kg (129 lb 11.2 oz), SpO2 90 %, not currently breastfeeding. Pain:  Pain Scale 1: Adult Nonverbal Pain Scale  Pain Intensity 1: 0                   Constitutional: restless with attempts at interaction  Cardiovascular: regular rhythm, distal pulses intact  Respiratory: breathing not labored, symmetric  Gastrointestinal: tenderness to palpation  Musculoskeletal: no deformity, no tenderness to palpation  Skin: warm, dry; multiple areas of bruising / left knee abrasion  Neurologic: not following commands, moving all extremities  Psychiatric: oriented x 0     HISTORY:     Active Problems:    Pneumoperitoneum (3/12/2017)      Cirrhosis ([address] (3/12/2017)      Ischemic bowel disease ([address] (3/12/2017)      Liver failure ([address] (3/12/2017)      Sepsis ([address] (3/12/2017)      Past Medical History:   Diagnosis Date    Liver disease       No past surgical history on file.    No family history on file.  History reviewed, no pertinent family history. Social History   Substance Use Topics    Smoking status: Not on file    Smokeless tobacco: Not on file    Alcohol use Not on file     Allergies   Allergen Reactions    Iodine Unknown (comments)    Pcn [Penicillins] Unknown (comments)    Sulfa (Sulfonamide Antibiotics) Unknown (comments)      Current Facility-Administered Medications   Medication Dose Route Frequency    morphine (ROXANOL) concentrated oral syringe 10 mg  10 mg Oral ONCE    atropine 1 % ophthalmic solution 3 Drop  3 Drop SubLINGual ONCE    morphine (ROXANOL) concentrated oral syringe 10 mg  10 mg SubLINGual Q4H    LORazepam (ATIVAN) injection 1 mg  1 mg IntraVENous Q4H PRN    glycopyrrolate (ROBINUL) injection 0.2 mg  0.2 mg IntraVENous Q4H PRN    scopolamine (TRANSDERM-SCOP) 1.5 mg  1.5 mg TransDERmal Q72H    atropine 1 % ophthalmic solution 2 Drop  2 Drop SubLINGual Q1H PRN    LORazepam (INTENSOL) 2 mg/mL oral concentrate 1 mg  1 mg SubLINGual Q4H    morphine injection 2 mg  2 mg IntraVENous Q2H PRN        LAB AND IMAGING FINDINGS:     Lab Results   Component Value Date/Time    WBC 3.6 03/12/2017 11:06 AM    HGB 8.4 03/12/2017 11:06 AM    PLATELET 904 29/66/6029 11:06 AM     Lab Results   Component Value Date/Time    Sodium 139 03/13/2017 03:29 AM    Potassium 2.9 03/13/2017 03:29 AM    Chloride 105 03/13/2017 03:29 AM    CO2 25 03/13/2017 03:29 AM    BUN 7 03/13/2017 03:29 AM    Creatinine 0.73 03/13/2017 03:29 AM    Calcium 7.9 03/13/2017 03:29 AM      Lab Results   Component Value Date/Time    AST (SGOT) 183 03/12/2017 11:06 AM    Alk.  phosphatase 104 03/12/2017 11:06 AM    Protein, total 9.3 03/12/2017 11:06 AM    Albumin 3.4 03/12/2017 11:06 AM    Globulin 5.9 03/12/2017 11:06 AM     Lab Results   Component Value Date/Time    INR 2.7 03/12/2017 11:06 AM    Prothrombin time 27.3 03/12/2017 11:06 AM    aPTT 46.0 03/12/2017 11:06 AM      No results found for: IRON, FE, TIBC, IBCT, PSAT, FERR   No results found for: PH, PCO2, PO2  No components found for: [place]   Lab Results   Component Value Date/Time    CK 36 03/12/2017 11:06 AM    CK - MB <1.0 03/12/2017 11:06 AM              Total time: 70 minutes  Counseling / coordination time: 55 minutes  > 50% counseling / coordination?: yes    Prolonged service was provided for  []30 min   []75 min in face to face time in the presence of the patient. Time Start:   Time End:   Note: this can only be billed with 34702 (initial) or 02570 (follow up). If multiple start / stop times, list each separately.

## 2017-03-13 NOTE — TELEPHONE ENCOUNTER
Received call from Luda Arroyo regarding patient and Hospice referral.  She stated that the patients daughter will be taking the patient home but with another Hospice, and not with [facility].

## 2017-03-13 NOTE — PROGRESS NOTES
[address]  [phone]    Date Consult Received: 3/12/17  12:38pm  Consult placed by:  Dr. Annalise Holcomb  Reason for consult: Care Decisions    Patient summary:  Deidra Olvera is a 47y.o. year old with a past history of end stage liver disease and cirrhosis , who was admitted on 3/12/2017 from [facility] with a diagnosis of abdominal pain, evidence of bowel perforation and concern for sepsis. They are currently in this location: 453/01.     Is this Primary Palliative Care?  __ Basic pain management   __ Initial resuscitation discussion  __ Routine advance care planning (advance directive, healthcare power of )   __ Basic questions about hospice benefit/services  __ Entering and managing comfort care orders for patients who are comfort care only  __ Following through with details of post-discharge disposition once goals are clear    IF YES,  ACTION:     Is this consultation out of scope of Palliative Medicine?   __ Chronic nonmalignant pain with no serious/life-limiting illness   __ Assessment and management of a substance-abuse disorder    IF YES, ACTION:    Is this a specialty Palliative Medicine Consultation?  __ Advanced pain management  in patients with advanced illness  _X_ Communication about prognosis in advanced illness including care options  __ Complex resuscitation discussions  __ ED consultations that by addressing care goals may impact location of admission  __ Family meeting in ICU patients with limited prognosis or advanced illness   _X_ Psychosocial and spiritual support for patients and families with distress from advanced illness    Is this consultation:  __ Emergent*  PM Team notified to see by close of business day*  *Patient experiencing intolerable escalating symptoms due to advanced illness    __ Urgent**  PM Team notified to see within 24 hours   **Emergent or Urgent is NOT based on time of discharge    _X_ Routine  PM Team reviews medical information, introduces service, and schedules meeting time within 72 hours    ACTION/Notes:  _X_ Advance Care Planning Flowsheet assessed and updated  - Durable DNR and POST form on chart relaying DNR status. No AMD on file. NP Antonio Villarreal to see this morning to discuss goals of care. _X_ Referring team notified of actions    Palliative Medicine  [place]: [phone]  [facility]: [phone])      Resuscitation Status: DNR   Durable DNR addressed?   [x] Yes   [] No    [] Not Applicable    Advance Care Planning 3/12/2017   Patient's Healthcare Decision Maker is: Legal Next of Kin   Primary Decision Maker Name Josh Johnson   Primary Decision Maker Phone Number [phone]   Primary Decision Maker Relationship to Patient Adult child   Secondary Decision Maker Name Luiz Alliqua Phone Number [phone]   Secondary Decision Maker Relationship to Patient Adult child   Confirm Advance Directive Yes, on file   Does the patient have other document types Other (comment);MOST/MOLST/POST/POLST

## 2017-03-13 NOTE — PROGRESS NOTES
Received a call from patient's daughter Mrs. Sahil Ty who voiced that she wanted to take her mom home with hospice, preferably today. She voiced that she would like [facility]. Belkys Gresham spoke with daughter via telephone. Order received for hospice eval for home and placed in [facility]. [facility] to note for pending re-eval for GIP or comfort bed in the AM. Will continue to follow for support and smooth transition home with hospice. In reviewing chart, no notes visible regarding hospice notification from order written earlier today. Call made to [facility] to inform them of consult and wishes for patient to go home tomorrow. Spoke with Rolanda who was given name, birthdate, insurance information diagnosis, daughter's name and contact number. Informed case management of need for information to be faxed to facilitate hospice discharge.

## 2017-03-13 NOTE — HOSPICE
Hospice in to eval pt - no family members present. Per pt primary RN, dtr left and will be back later this evening. Pt awakens to verbal stimulation. Appears to be confused. Per primary RN, gaye just placed r/t retention, and 1000ml urine returned. Palliative care addressing comfort needs, no signs of discomfort or distress for hospice GIP, and pt does not appears imminent - with 2-3 days left - so pt not appropriate for GIP (uncontrolled symptoms), or comfort bed appropriate (death expected 2-3 days). Collaboration with Dr [facility] Director - pt to be re-evaluated for inpatient hospice appropriateness tomorrow.

## 2017-03-13 NOTE — ROUTINE PROCESS
Patient's POA stated no future treatment for this patient. She wants her to be as comfortable as possible. No lab draws. She also started patient can only have Morphine and Ativan.

## 2017-03-13 NOTE — HOSPICE
Hospice nurse made visit to evaluate patient for inpatient hospice. Discussed inpatient hospice criteria with patient's daughter and mother. Patient is quiet in bed-arouses easily but is lethargic with minimal verbal response. Discussed findings with hospice medical director who states that patient does not meet criteria for comfort bed or GIP at this time. Hospice medical director request that patient be re-evaluated tomorrow. ED MD and family made aware that patient does not meet criteria for inpatient hospice. Patient is being admitted to acute care.

## 2017-03-14 PROBLEM — F41.9 ANXIETY: Status: ACTIVE | Noted: 2017-01-01

## 2017-03-14 PROBLEM — R06.02 SHORTNESS OF BREATH AT REST: Status: ACTIVE | Noted: 2017-01-01

## 2017-03-14 PROBLEM — G40.909 SEIZURE DISORDER (HCC): Status: ACTIVE | Noted: 2017-01-01

## 2017-03-14 NOTE — CONSULTS
Spoke with daughter Vee Allison - continued wish is for comfort and discharge to home today with home hospice. Tylenol suppository ordered scheduled per request of daughter. Have spoken to case management Jennifer Zimmerman) whom is planning on getting clinical information over to [facility] per family request.     Full note to follow.

## 2017-03-14 NOTE — ROUTINE PROCESS
Bedside and Verbal shift change report given to [facility] [place], LPN (oncoming nurse) by Cassidy Corea RN (offgoing nurse). Report included the following information SBAR, Kardex, Intake/Output, MAR, Recent Results and Med Rec Status.

## 2017-03-14 NOTE — PROGRESS NOTES
Progress Note    Patient: Trinidad Michael MRN: [id number]  SSN: [id number]    YOB: 1965  Age: 47 y.o. Sex: female      Admit Date: 3/12/2017    LOS: 2 days     Subjective:     Patient admitted with endstage liver disease and bowel perforation with peritonitis. Patient is comfort measures only. Patient very poorly responsive with shallow breathing today. Objective:     Vitals:    03/13/17 0542 03/13/17 0925 03/13/17 2020 03/14/17 0400   BP: 105/71 103/67 91/65 144/73   Pulse: 93 87 77 79   Resp: 20 20 20 18   Temp: 97.6 °F (36.4 °C) 97.6 °F (36.4 °C) 99.6 °F (37.6 °C) 97.7 °F (36.5 °C)   SpO2: 98% 92% 91% 90%   Weight:       Height:            Intake and Output:  Current Shift: 03/14 0701 - 03/14 1900  In: 275 [P.O.:275]  Out: -   Last three shifts: 03/12 1901 - 03/14 0700  In: 0   Out: 1700 [Urine:1700]    Physical Exam:   GENERAL: alert, cooperative, no distress, appears older than stated age, toxic  LUNG: diminished breath sounds R base, L base  HEART: regular rate and rhythm, S1, S2 normal, no murmur, click, rub or gallop  ABDOMEN: abnormal findings:  hypoactive bowel sounds    Lab/Data Review: All lab results for the last 24 hours reviewed. No labs    Assessment:     Active Problems:    Pneumoperitoneum (3/12/2017)      Cirrhosis ([address] (3/12/2017)      Ischemic bowel disease ([address] (3/12/2017)      Liver failure ([address] (3/12/2017)      Sepsis ([address] (3/12/2017)      Seizure disorder ([address] (3/14/2017)      Anxiety (3/14/2017)      Shortness of breath at rest (3/14/2017)        Plan:     Comfort measures Patient expectant. Felt that patient too unstable for transport.     Signed By: Ariana Calderon MD     March 14, 2017

## 2017-03-14 NOTE — PROGRESS NOTES
Dr. Beatris Brice recommending comfort care at the current time , I have discussed this with Ms. Amelia Crenshaw will discuss with daughter. . CM to follow on daily basis.

## 2017-03-14 NOTE — CONSULTS
[facility] [place][note 2]: [phone])  [facility] [phone]   [facility]: [phone]    Patient Name: Juan Pablo Tafoya  YOB: 1965    Date of Initial Consult: 03/13/2017  Reason for Consult: End Stage Disease  Requesting Provider: Christiano Quach MD  Primary Care Physician: None      SUMMARY:   Juan Pablo Tafoya is a 47y.o. year old with a past history of end stage liver disease with cirrhosis, seizure disorder who was admitted on 3/12/2017 from [facility] with a diagnosis of abdominal pain. Current medical issues leading to Palliative Medicine involvement include: end stage disease. PALLIATIVE DIAGNOSES:   1. Liver failure  2. Sepsis  3. Anxiety  4. Seizure disorder  5. Shortness of breath     PLAN:   1. Follow up with patient, phone call to daughter Luh Paz (MPOA) as well as daughter and  at bedside. Per decision by attending patient to stay here inpatient under comfort measures at this time. Family has agreed to discharge with hospice if medical team feels safe for discharge. 2. Discussed with daughter current measures. Discussed increasing morphine to 10mg sublingual Q 4 hours and addition of scheduled tylenol for fevers. Mentioned to family at bedside option of adding a morphine drip for pain / symptom control at this time. Palliative medicine will continue to follow for goal of comfort at this time. 3. Initial consult note routed to primary continuity provider  4.  Communicated plan of care with: Palliative IDT, attending, daughter / MPOA,      GOALS OF CARE:     [====Goals of Care====]  GOALS OF CARE:  Patient / health care proxy stated goals: comfort    TREATMENT PREFERENCES:   Code Status: DNR    Advance Care Planning:  Advance Care Planning 3/12/2017   Patient's Healthcare Decision Maker is: Legal Next of Henri Kirkland   Primary Decision Maker Name [address] [facility]   Primary Decision Maker Phone Number [phone]   Primary Decision Maker Relationship to Patient Adult child   Secondary Decision Maker Name Luiz [address] Phone Number [phone]   Secondary Decision Maker Relationship to Patient Adult child   Confirm Advance Directive Yes, on file   Does the patient have other document types Other (comment);MOST/MOLST/POST/POLST     Other:    The palliative care team has discussed with patient / health care proxy about goals of care / treatment preferences for patient.  [====Goals of Care====]    Advance Care Planning 3/12/2017   Patient's Healthcare Decision Maker is: Legal Next of Henri Kirkland   Primary Decision Maker Name Satya Flores   Primary Decision Maker Phone Number [phone]   Primary Decision Maker Relationship to Patient Adult child   Secondary Decision Maker Name Luiz [address] Phone Number [phone]   Secondary Decision Maker Relationship to Patient Adult child   Confirm Advance Directive Yes, on file   Does the patient have other document types Other (comment);MOST/MOLST/POST/POLST       HISTORY:     History obtained from: daughter    CHIEF COMPLAINT: restlessness    HPI/SUBJECTIVE:    The patient is:   [] Verbal and participatory  [x] Non-participatory due to:   Daughter shares patient has been consuming alcohol for a long time and she states sadness that she is in this condition at this time as result. Patient was most recently at [facility] and discharged to nursing facility last Friday and since became progressively worse / weaker at the nursing facility resulting in admission to [facility].       Clinical Pain Assessment (nonverbal scale for nonverbal patients): Pain: 5  Adult Non-Verbal Pain Assessment    Face  [] 0   No particular expression or smile  [x] 1   Occasional grimace, tearing, frowning, wrinkled forehead  [] 2   Frequent grimace, tearing, frowning, wrinkled forehead    Activity (movement)  [x] 0   Lying quietly, normal position  [] 1   Seeking attention through movement or slow, cautious movement  [] 2   Restless, excessive activity and/or withdrawal reflexes    Guarding  [x] 0   Lying quietly, no positioning of hands over areas of body  [] 1   Splinting areas of the body, tense  [] 2   Rigid, stiff    Physiology (vital signs)  [x] 0   Stable vital signs  [] 1   Change in any of the following: SBP > 20mm Hg; HR > 20/minute  [] 2   Change in any of the following: SBP > 30mm Hg; HR > 25/minute    Respiratory  [] 0   Baseline RR/SpO2, compliant with ventilator  [x] 1   RR > 10 above baseline, or 5% drop SpO2, mild asynchrony with ventilator  [] 2   RR > 20 above baseline, or 10% drop SpO2, asynchrony with ventilator    Total Non-Verbal Pain Score: 1     FUNCTIONAL ASSESSMENT:     Palliative Performance Scale (PPS):  PPS: 20       PSYCHOSOCIAL/SPIRITUAL SCREENING:     Advance Care Planning:  Advance Care Planning 3/12/2017   Patient's Healthcare Decision Maker is: Legal Next of Henri [address]   Primary Decision Maker Name Gibran Higuera   Primary Decision Maker Phone Number [phone]   Primary Decision Maker Relationship to Patient Adult child   Secondary Decision Maker Name Luiz Juarez Phone Number [phone]   Secondary Decision Maker Relationship to Patient Adult child   Confirm Advance Directive Yes, on file   Does the patient have other document types Other (comment);MOST/MOLST/POST/POLST     Any spiritual / Zoroastrian concerns:  [] Yes /  [x] No    Caregiver Burnout:  [] Yes /  [x] No /  [] No Caregiver Present      Anticipatory grief assessment:   [x] Normal  / [] Maladaptive       ESAS Anxiety: Anxiety: 6    ESAS Depression:          REVIEW OF SYSTEMS:     Positive and pertinent negative findings in ROS are noted above in HPI. The following systems were [] reviewed / [x] unable to be reviewed as noted in HPI  Other findings are noted below.   Systems: constitutional, ears/nose/mouth/throat, respiratory, gastrointestinal, genitourinary, musculoskeletal, integumentary, neurologic, psychiatric, endocrine. Positive findings noted below. Modified ESAS Completed by: provider   Fatigue: 0 Drowsiness: 0     Pain: 5   Anxiety: 6       Dyspnea: 0   Best Well-Bein Constipation: No   Other Problem (Comment): 0          PHYSICAL EXAM:     Wt Readings from Last 3 Encounters:   17 58.8 kg (129 lb 11.2 oz)     Blood pressure 144/73, pulse 79, temperature 97.7 °F (36.5 °C), resp. rate 18, height 5' 5\" (1.651 m), weight 58.8 kg (129 lb 11.2 oz), SpO2 90 %, not currently breastfeeding. Pain:  Pain Scale 1: Adult Nonverbal Pain Scale  Pain Intensity 1: 0                   Constitutional: non-responsive to verbal cues  Cardiovascular: regular rhythm, distal pulses intact  Respiratory: breathing more labored today, symmetric  Gastrointestinal: tenderness to palpation  Musculoskeletal: no deformity, no tenderness to palpation  Skin: warm, dry; multiple areas of bruising / left knee abrasion  Neurologic: not following commands  Psychiatric: oriented x 0     HISTORY:     Active Problems:    Pneumoperitoneum (3/12/2017)      Cirrhosis ([facility] (3/12/2017)      Ischemic bowel disease ([facility] (3/12/2017)      Liver failure ([facility] (3/12/2017)      Sepsis ([facility] (3/12/2017)      Seizure disorder ([facility] (3/14/2017)      Anxiety (3/14/2017)      Past Medical History:   Diagnosis Date    Liver disease       No past surgical history on file. No family history on file. History reviewed, no pertinent family history.   Social History   Substance Use Topics    Smoking status: Not on file    Smokeless tobacco: Not on file    Alcohol use Not on file     Allergies   Allergen Reactions    Iodine Unknown (comments)    Pcn [Penicillins] Unknown (comments)    Sulfa (Sulfonamide Antibiotics) Unknown (comments)      Current Facility-Administered Medications   Medication Dose Route Frequency    morphine (ROXANOL) concentrated oral syringe 10 mg  10 mg Oral ONCE    atropine 1 % ophthalmic solution 3 Drop  3 Drop SubLINGual ONCE    morphine (ROXANOL) concentrated oral syringe 10 mg  10 mg SubLINGual Q4H    acetaminophen (TYLENOL) suppository 650 mg  650 mg Rectal Q6H    LORazepam (ATIVAN) injection 1 mg  1 mg IntraVENous Q4H PRN    glycopyrrolate (ROBINUL) injection 0.2 mg  0.2 mg IntraVENous Q4H PRN    scopolamine (TRANSDERM-SCOP) 1.5 mg  1.5 mg TransDERmal Q72H    atropine 1 % ophthalmic solution 2 Drop  2 Drop SubLINGual Q1H PRN    LORazepam (INTENSOL) 2 mg/mL oral concentrate 1 mg  1 mg SubLINGual Q4H    morphine injection 2 mg  2 mg IntraVENous Q2H PRN        LAB AND IMAGING FINDINGS:     Lab Results   Component Value Date/Time    WBC 3.6 03/12/2017 11:06 AM    HGB 8.4 03/12/2017 11:06 AM    PLATELET 983 58/14/7216 11:06 AM     Lab Results   Component Value Date/Time    Sodium 139 03/13/2017 03:29 AM    Potassium 2.9 03/13/2017 03:29 AM    Chloride 105 03/13/2017 03:29 AM    CO2 25 03/13/2017 03:29 AM    BUN 7 03/13/2017 03:29 AM    Creatinine 0.73 03/13/2017 03:29 AM    Calcium 7.9 03/13/2017 03:29 AM      Lab Results   Component Value Date/Time    AST (SGOT) 183 03/12/2017 11:06 AM    Alk.  phosphatase 104 03/12/2017 11:06 AM    Protein, total 9.3 03/12/2017 11:06 AM    Albumin 3.4 03/12/2017 11:06 AM    Globulin 5.9 03/12/2017 11:06 AM     Lab Results   Component Value Date/Time    INR 2.7 03/12/2017 11:06 AM    Prothrombin time 27.3 03/12/2017 11:06 AM    aPTT 46.0 03/12/2017 11:06 AM      No results found for: IRON, FE, TIBC, IBCT, PSAT, FERR   No results found for: PH, PCO2, PO2  No components found for: [place]   Lab Results   Component Value Date/Time    CK 36 03/12/2017 11:06 AM    CK - MB <1.0 03/12/2017 11:06 AM              Total time: 35 minutes  Counseling / coordination time: 25 minutes  > 50% counseling / coordination?: yes    Prolonged service was provided for  []30 min   []75 min in face to face time in the presence of the patient. Time Start:   Time End:   Note: this can only be billed with 04745 (initial) or 65886 (follow up). If multiple start / stop times, list each separately.

## 2017-03-15 NOTE — PROGRESS NOTES
0730-post-mortem care performed. Patient's wedding rings remain on left ring finger. Dentures in cup with body.

## 2017-03-15 NOTE — CDMP QUERY
Please clarify if this patient is being treated/managed for:    =>Coma in setting of liver failure  =>Other Explanation of clinical findings  =>Unable to Determine (no explanation of clinical findings)    The medical record reflects the following clinical findings, treatment, and risk factors:    Patient was noted to be unresponsive to voice or pain. Patient comfort measures and has now . If you DECLINE this query or would like to communicate with [employer], please utilize the \"[employer] message box\" at the TOP of the Progress Note on the right. QUESTIONS?    Ilda Christiansen -[phone]

## 2017-03-15 NOTE — PROGRESS NOTES
Went in to give medication, patient took last three breaths. Family at bedside. House Dr. Susi Baez per nursing staff.

## 2017-03-15 NOTE — INTERDISCIPLINARY ROUNDS
IDR/SLIDR Summary          Patient: Camilo Olguin MRN: [id number]    Age: 47 y.o. YOB: 1965 Room/Bed: [facility]   Admit Diagnosis: Ischemic bowel disease ([facility]  Liver failure (HCC)  Sepsis ([facility]  Principal Diagnosis: <principal problem not specified>   Goals: comfort, peace  Readmission: NO  Quality Measure: Not applicable  VTE Prophylaxis: Not needed  Influenza Vaccine screening completed? NO  Pneumococcal Vaccine screening completed? NO  Mobility needs: No   Nutrition plan:No  Consults:    Financial concerns:No  Escalated to CM?  NO  RRAT Score:19   Interventions:Palliative Care   Testing due for pt today? no  LOS: 3 days Expected length of stay 5 days  Discharge plan: unknown   PCP: None  Transportation needs: No    Days before discharge:discharge pending  Discharge disposition: pending    Signed:     Nahomi Jones RN  3/15/2017  3:03 AM

## 2017-03-15 NOTE — CDMP QUERY
Please clarify if this patient presented with liver failure and was it:     =>Acute or subacute in setting endstage of diease;   =>Other Explanation of clinical findings  =>Unable to Determine (no explanation of clinical findings)    The medical record reflects the following clinical findings, treatment, and risk factors:     Patient presented with ams with ammonia level 55, abdominal pain and jaundice and ascites. Patient with history of liver failure . Patient is comfort care and went to hospice and now has . If you DECLINE this query or would like to communicate with [facility], please utilize the \"[facility] message box\" at the TOP of the Progress Note on the right. QUESTIONS?      Marcelle Fitzgerald RN- [phone]

## 2017-03-15 NOTE — PROGRESS NOTES
responded to Death of  Haris Acosta, who was a 47 y.o.,female,     The  provided the following Interventions:  Provided crisis pastoral care, pastoral support and grief interventions. Prayed with and for the family and patient. Chart reviewed. Family will be using the [facility], [place], location. Family thanked  for being present.       Christopher Marc [facility]  [phone]
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
## 2017-03-15 NOTE — ROUTINE PROCESS
Bedside and Verbal shift change report given to CYDNEY Millan (oncoming nurse) by Kristi Nelson (offgoing nurse). Report included the following information SBAR, Kardex and MAR.

## 2017-03-15 NOTE — PROGRESS NOTES
DEATH PRONOUNCEMENT NOTE  [facility] Service    Patient: Arben Baird MRN: [id number]   SSN: [id number]  YOB: 1965   Age: 47 y.o. Sex: female    Admission Date: 3/12/2017 10:18 AM Time of Death: 5:18 am        Comments:   Called to patient's bedside for apnea and pulselessness, pt was DNR and hospice. Patient lying in bed, unresponsive to voice or painful stimuli. No spontaneous respirations. No palpable carotid pulse bilaterally. No heart sounds or breath sounds. Pupils fixed and dilated bilaterally. Family  at bed side had no questions. Attending physician to be notified by nursing staff.     The [facility] House Officer can be reached at pager  #[phone]    Signed,  Earlene Bowen MD    March 15, 2017    5:21 AM

## 2017-03-15 NOTE — PROGRESS NOTES
Patient unresponsive to voice or pain. Patient sleeping and non-verbal. Pt on comfort measures. No signs of distress at this time. Keiko Bussing at bedside for oral suction. Gar catheter in place, bloody urine. Call bell in place, bed in low position and locked . Morphine and Ativan scheduled q4h for comfort.  Family at bedside, door closed per family request.

## 2017-03-18 LAB
BACTERIA SPEC CULT: NORMAL
BACTERIA SPEC CULT: NORMAL
SERVICE CMNT-IMP: NORMAL
SERVICE CMNT-IMP: NORMAL